# Patient Record
Sex: MALE | Race: OTHER | Employment: UNEMPLOYED | URBAN - METROPOLITAN AREA
[De-identification: names, ages, dates, MRNs, and addresses within clinical notes are randomized per-mention and may not be internally consistent; named-entity substitution may affect disease eponyms.]

---

## 2023-03-24 ENCOUNTER — APPOINTMENT (OUTPATIENT)
Dept: GENERAL RADIOLOGY | Age: 48
End: 2023-03-24

## 2023-03-24 ENCOUNTER — HOSPITAL ENCOUNTER (EMERGENCY)
Age: 48
Discharge: HOME | End: 2023-03-24
Attending: EMERGENCY MEDICINE

## 2023-03-24 VITALS
DIASTOLIC BLOOD PRESSURE: 85 MMHG | WEIGHT: 158 LBS | TEMPERATURE: 97.3 F | RESPIRATION RATE: 14 BRPM | SYSTOLIC BLOOD PRESSURE: 133 MMHG | HEIGHT: 61 IN | OXYGEN SATURATION: 98 % | BODY MASS INDEX: 29.83 KG/M2 | HEART RATE: 80 BPM

## 2023-03-24 DIAGNOSIS — S51.811A LACERATION OF RIGHT FOREARM, INITIAL ENCOUNTER: Primary | ICD-10-CM

## 2023-03-24 PROCEDURE — 90714 TD VACC NO PRESV 7 YRS+ IM: CPT | Performed by: EMERGENCY MEDICINE

## 2023-03-24 PROCEDURE — 6360000002 HC RX W HCPCS: Performed by: EMERGENCY MEDICINE

## 2023-03-24 PROCEDURE — 90471 IMMUNIZATION ADMIN: CPT | Performed by: EMERGENCY MEDICINE

## 2023-03-24 PROCEDURE — 99284 EMERGENCY DEPT VISIT MOD MDM: CPT | Performed by: EMERGENCY MEDICINE

## 2023-03-24 PROCEDURE — 12002 RPR S/N/AX/GEN/TRNK2.6-7.5CM: CPT | Performed by: EMERGENCY MEDICINE

## 2023-03-24 PROCEDURE — 96372 THER/PROPH/DIAG INJ SC/IM: CPT | Performed by: EMERGENCY MEDICINE

## 2023-03-24 RX ADMIN — CLOSTRIDIUM TETANI TOXOID ANTIGEN (FORMALDEHYDE INACTIVATED) AND CORYNEBACTERIUM DIPHTHERIAE TOXOID ANTIGEN (FORMALDEHYDE INACTIVATED) 0.5 ML: 5; 2 INJECTION, SUSPENSION INTRAMUSCULAR at 19:35

## 2023-03-24 ASSESSMENT — PAIN - FUNCTIONAL ASSESSMENT: PAIN_FUNCTIONAL_ASSESSMENT: 0-10

## 2023-03-24 ASSESSMENT — PAIN SCALES - GENERAL: PAINLEVEL_OUTOF10: 0

## 2023-03-24 NOTE — ED TRIAGE NOTES
Pt w large, deep laceration to posterior R forearm w active bleeding s/p cut on glass at home 1400   A&Ox4

## 2023-03-24 NOTE — ED PROVIDER NOTES
Emergency Department Provider Note                   PCP:                Pcp No               Age: 49 y.o. Sex: male     DISPOSITION       No diagnosis found. MEDICAL DECISION MAKING  Complexity of Problems Addressed:  Complexity of Problem: 1 acute, uncomplicated illness or injury. Complexity of Problem: 3 cm laceration to the right forearm that requires evaluation for possible foreign body prior to primary closure. Data Reviewed and Analyzed:  Category 1:     I ordered each unique test.  I interpreted the results of each unique test.        Category 2:   I independently ordered and interpreted the X-rays. No evidence of foreign body or fracture    Category 3: Discussion of management or test interpretation. My independent interpretation of the x-ray did not identify any foreign bodies or fracture       Risk of Complications and/or Morbidity of Patient Management:      Funmilayo Martin is a 49 y.o. male who presents to the Emergency Department with chief complaint of    Chief Complaint   Patient presents with    Laceration      Patient presents to the emergency department for evaluation of a laceration to the right forearm. Patient is right-hand dominant. He reports tripping and falling at home and cutting his arm on a piece of glass table. The glass itself did not break and they do not believe there is any foreign body. Tetanus status is unknown. He denies any other injuries and has no other complaints. The history is provided by the patient. The history is limited by a language barrier. A  was used (Family member acted as ). Vitals signs and nursing note reviewed. Patient Vitals for the past 4 hrs:   Temp Pulse Resp BP SpO2   03/24/23 1822 97.3 °F (36.3 °C) 80 14 133/85 98 %        Physical Exam  Vitals reviewed. Constitutional:       General: He is not in acute distress. Appearance: Normal appearance.  He is not ill-appearing, foreign body identified in the right forearm. This exam was interpreted by a board-certified, body-imaging fellowship trained   radiologist from [address] [place] [address]. If there are any questions regarding   this examination please feel free to contact a radiologist at [phone]. Slot 15     Ethelene Fothergill   3/24/2023 7:57:00 PM        XR Forearm Right   Final Result      1. No radiopaque foreign body identified in the right forearm. This exam was interpreted by a board-certified, body-imaging fellowship trained    radiologist from [address] [place] [address]. If there are any questions regarding    this examination please feel free to contact a radiologist at [phone]. Slot 15       Ethelene Fothergill    3/24/2023 7:57:00 PM            Voice dictation software was used during the making of this note. This software is not perfect and grammatical and other typographical errors may be present. This note has not been completely proofread for errors.        Xiomara Whittaker,   03/24/23 2027

## 2023-03-25 NOTE — ED NOTES
RN cleaned and dressed wound on right forearm with antibiotic ointment, nonstick gauze, and bulky gauze.       Marlon Lucas RN  03/24/23 2038       Marlon Lucas RN  03/24/23 2050

## 2023-03-25 NOTE — ED NOTES
I have reviewed discharge instructions with the patient. The patient verbalized understanding. Patient left ED via Discharge Method: ambulatory to Home with family. Opportunity for questions and clarification provided. Patient given 0 scripts. To continue your aftercare when you leave the hospital, you may receive an automated call from our care team to check in on how you are doing. This is a free service and part of our promise to provide the best care and service to meet your aftercare needs.  If you have questions, or wish to unsubscribe from this service please call [phone]. Thank you for Choosing our [facility] Emergency Department.           Cheryl Kiser RN  03/24/23 3960

## 2023-04-08 ENCOUNTER — HOSPITAL ENCOUNTER (EMERGENCY)
Age: 48
Discharge: HOME | End: 2023-04-08
Attending: EMERGENCY MEDICINE

## 2023-04-08 VITALS
WEIGHT: 160 LBS | BODY MASS INDEX: 30.6 KG/M2 | DIASTOLIC BLOOD PRESSURE: 78 MMHG | HEART RATE: 70 BPM | OXYGEN SATURATION: 98 % | TEMPERATURE: 98.2 F | SYSTOLIC BLOOD PRESSURE: 144 MMHG | RESPIRATION RATE: 20 BRPM

## 2023-04-08 DIAGNOSIS — Z48.02 ENCOUNTER FOR REMOVAL OF SUTURES: Primary | ICD-10-CM

## 2023-04-08 ASSESSMENT — PAIN - FUNCTIONAL ASSESSMENT: PAIN_FUNCTIONAL_ASSESSMENT: NONE - DENIES PAIN

## 2023-04-08 NOTE — ED PROVIDER NOTES
Emergency Department Provider Note       PCP: Pcp No   Age: 49 y.o. Sex: male     DISPOSITION Decision To Discharge 04/08/2023 11:32:18 AM       ICD-10-CM    1. Encounter for removal of sutures  Z48.02           Medical Decision Making     Complexity of Problems Addressed:  Complexity of Problem: 1 acute, uncomplicated illness or injury. Data Reviewed and Analyzed:  Category 1:   I independently ordered and reviewed each unique test.  I reviewed external records: ED visit note from an outside group. Category 2:       Category 3: Discussion of management or test interpretation. Well-appearing 23-year-old male presents emergency department today for suture removal.  Patient appears in no acute distress. Wound appears with normal healing. No signs of infection. After verbal consent obtained, 7 sutures removed without difficulty. Patient tolerated well. Educated on further wound care. Risk of Complications and/or Morbidity of Patient Management:  Shared medical decision making was utilized in creating the patients health plan today. History     Yosi Mccall is a 49 y.o. male who presents to the Emergency Department with chief complaint of    Chief Complaint   Patient presents with    Suture / Staple Removal      23-year-old male presents emergency department today for suture removal from the right forearm. Patient states that about 15 days ago he tripped and fell, cutting his right forearm on a coffee table. He denies any issues with the wound. The history is provided by the patient. A  was used. Physical Exam     Vitals signs and nursing note reviewed. Patient Vitals for the past 4 hrs:   Temp Pulse Resp BP SpO2   04/08/23 1126 98.2 °F (36.8 °C) -- -- -- --   04/08/23 1125 -- 70 20 (!) 144/78 98 %        Physical Exam  Vitals and nursing note reviewed. Constitutional:       General: He is not in acute distress.      Appearance: Normal

## 2023-04-08 NOTE — ED NOTES
I have reviewed discharge instructions with the patient. The patient verbalized understanding. Patient left ED via Discharge Method: ambulatory to Home with (Self). Opportunity for questions and clarification provided. Patient given 0 scripts. To continue your aftercare when you leave the hospital, you may receive an automated call from our care team to check in on how you are doing. This is a free service and part of our promise to provide the best care and service to meet your aftercare needs.  If you have questions, or wish to unsubscribe from this service please call [phone]. Thank you for Choosing our [facility] Emergency Department.         Albert Rawls RN  04/08/23 6392